# Patient Record
Sex: MALE | URBAN - METROPOLITAN AREA
[De-identification: names, ages, dates, MRNs, and addresses within clinical notes are randomized per-mention and may not be internally consistent; named-entity substitution may affect disease eponyms.]

---

## 2021-06-28 ENCOUNTER — APPOINTMENT (RX ONLY)
Dept: URBAN - METROPOLITAN AREA CLINIC 58 | Facility: CLINIC | Age: 46
Setting detail: DERMATOLOGY
End: 2021-06-28

## 2021-06-28 DIAGNOSIS — L81.4 OTHER MELANIN HYPERPIGMENTATION: ICD-10-CM | Status: INADEQUATELY CONTROLLED

## 2021-06-28 PROCEDURE — ? ADDITIONAL NOTES

## 2021-06-28 PROCEDURE — 99202 OFFICE O/P NEW SF 15 MIN: CPT

## 2021-06-28 PROCEDURE — ? COUNSELING

## 2021-06-28 ASSESSMENT — LOCATION DETAILED DESCRIPTION DERM: LOCATION DETAILED: SUPERIOR MID FOREHEAD

## 2021-06-28 ASSESSMENT — LOCATION SIMPLE DESCRIPTION DERM: LOCATION SIMPLE: SUPERIOR FOREHEAD

## 2021-06-28 ASSESSMENT — LOCATION ZONE DERM: LOCATION ZONE: FACE

## 2021-06-28 NOTE — PROCEDURE: ADDITIONAL NOTES
Additional Notes: Patient consent was obtained to proceed with the visit and recommended plan of care after discussion of all risks and benefits, including the risks of COVID-19 exposure.
Detail Level: Simple
Render Risk Assessment In Note?: no
Additional Notes: Patient presented with hyperpigmentation on forehead. Discussed treatment options with patient, including creams and/or lasers and chemical peels. Recommended patient see [facility] for further treatment options.

## 2021-06-29 ENCOUNTER — APPOINTMENT (RX ONLY)
Dept: URBAN - METROPOLITAN AREA CLINIC 57 | Facility: CLINIC | Age: 46
Setting detail: DERMATOLOGY
End: 2021-06-29

## 2021-06-29 DIAGNOSIS — L57.8 OTHER SKIN CHANGES DUE TO CHRONIC EXPOSURE TO NONIONIZING RADIATION: ICD-10-CM

## 2021-06-29 DIAGNOSIS — Z41.9 ENCOUNTER FOR PROCEDURE FOR PURPOSES OTHER THAN REMEDYING HEALTH STATE, UNSPECIFIED: ICD-10-CM

## 2021-06-29 PROCEDURE — 99212 OFFICE O/P EST SF 10 MIN: CPT

## 2021-06-29 PROCEDURE — ? SUNSCREEN RECOMMENDATIONS

## 2021-06-29 PROCEDURE — ? VI PEEL

## 2021-06-29 PROCEDURE — ? MEDICAL CONSULTATION HYDRAFACIAL

## 2021-06-29 PROCEDURE — ? COSMETIC CONSULTATION: CHEMICAL PEELS

## 2021-06-29 NOTE — PROCEDURE: SUNSCREEN RECOMMENDATIONS

## 2021-06-29 NOTE — PROCEDURE: VI PEEL
Chemical Peel: VI Peel Precision Plus
Prep: The treated area was degreased with pre-peel cleanser, and vaseline was applied for protection of mucous membranes.
Treatment Number: 1
Post Peel Care: After the procedure, the patient was instructed not to wash the treated area for 4 hours or manually remove dead skin when the peeling process starts. Patient may use OTC hydrocortisone cream for itching.  Patient instructed to use the provided Retin-A wipes on the treated area on the 1st and 2nd nights. Patient advised to follow instructions for 7 days before returning to regular skincare regimen.\\nAdvised patient they could apply a small amount of Vaseline around their mouth to reduce the discomfort they may experience. Instructed patient to email me with any questions or concerns.
Price (Use Numbers Only, No Special Characters Or $): 370.00
Detail Level: Zone
Consent: Prior to the procedure, written consent was obtained and risks were reviewed, including but not limited to: redness, peeling, blistering, pigmentary change, scarring, infection, and pain. Patient is aware multiple treatments may be necessary to achieve the desired outcome.
Post-Care Instructions: I reviewed with the patient in detail post-care instructions. Patient should avoid sun exposure and wear sun protection. Provided patient with a new clean sterile disposable face mask post treatment.

## 2021-07-27 ENCOUNTER — APPOINTMENT (RX ONLY)
Dept: URBAN - METROPOLITAN AREA CLINIC 57 | Facility: CLINIC | Age: 46
Setting detail: DERMATOLOGY
End: 2021-07-27

## 2021-07-27 DIAGNOSIS — Z41.9 ENCOUNTER FOR PROCEDURE FOR PURPOSES OTHER THAN REMEDYING HEALTH STATE, UNSPECIFIED: ICD-10-CM

## 2021-07-27 PROCEDURE — ? COSMETIC FOLLOW-UP

## 2021-07-27 PROCEDURE — ? VI PEEL

## 2021-07-27 NOTE — PROCEDURE: VI PEEL
Post Peel Care: After the procedure, the patient was instructed not to wash the treated area for 4 hours or manually remove dead skin when the peeling process starts. Patient may use OTC hydrocortisone cream for itching.  Patient instructed to use the provided Retin-A wipes on the treated area on the 1st and 2nd nights. Patient advised to follow instructions for 7 days before returning to regular skincare regimen.\\nInstructed patient to email me with any questions or concerns.
Detail Level: Zone
Post-Care Instructions: I reviewed with the patient in detail post-care instructions. Patient should avoid sun exposure and wear sun protection. Provided patient with a new clean sterile disposable face mask post treatment.
Chemical Peel: VI Peel Precision Plus
Price (Use Numbers Only, No Special Characters Or $): 370.00
Consent: Prior to the procedure, written consent was obtained and risks were reviewed, including but not limited to: redness, peeling, blistering, pigmentary change, scarring, infection, and pain. Patient is aware multiple treatments may be necessary to achieve the desired outcome.
Prep: The treated area was degreased with pre-peel cleanser
Treatment Number: 2

## 2021-07-27 NOTE — PROCEDURE: COSMETIC FOLLOW-UP
Side Effects Or Complications: None
Treatment Override (Free Text): vi peel precision plus
Global Improvement: Modest
Detail Level: Zone
Patient Satisfaction: Pleased
Treatment (Optional): Medium-depth Chemical Peel

## 2021-07-27 NOTE — HPI: COSMETIC (CHEMICAL PEEL)
Have You Had A Chemical Peel Before?: has had a previous peel
When Outside In The Sun, Do You...: mostly tans, rarely burns
When Was Your Last Peel?: One month ago

## 2021-09-16 ENCOUNTER — APPOINTMENT (RX ONLY)
Dept: URBAN - METROPOLITAN AREA CLINIC 57 | Facility: CLINIC | Age: 46
Setting detail: DERMATOLOGY
End: 2021-09-16

## 2021-09-16 DIAGNOSIS — Z41.9 ENCOUNTER FOR PROCEDURE FOR PURPOSES OTHER THAN REMEDYING HEALTH STATE, UNSPECIFIED: ICD-10-CM

## 2021-09-16 PROCEDURE — ? MEDICAL CONSULTATION HYDRAFACIAL

## 2021-09-16 PROCEDURE — ? VI PEEL

## 2021-09-16 NOTE — PROCEDURE: VI PEEL
Comments: Patient states he sees noticeable improvement and is very pleased. Patient states prior to beginning his peel series he started noticing acne issues mostly in his beard. I suggested using a cleanser from [employer] that has salicylic acid in it at least 2x weekly to help aid in eliminating the acne causing bacteria. 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

## 2021-09-16 NOTE — PROCEDURE: VI PEEL
Post Peel Care: After the procedure, the patient was instructed not to wash the treated area for 4 hours or manually remove dead skin when the peeling process starts. Patient may use OTC hydrocortisone cream for itching.  Patient instructed to use the provided Retin-A wipes on the treated area on the 1st and 2nd nights. Patient advised to follow instructions for 7 days before returning to regular skincare regimen.\\nInstructed patient to email me with any questions or concerns.

## 2021-09-16 NOTE — HPI: COSMETIC (CHEMICAL PEEL)
Have You Had A Chemical Peel Before?: has had a previous peel
When Outside In The Sun, Do You...: mostly tans, rarely burns
When Was Your Last Peel?: July 2021

## 2021-09-16 NOTE — PROCEDURE: VI PEEL
Post-Care Instructions: I reviewed with the patient in detail post-care instructions. Patient should avoid sun exposure and wear sun protection. Provided patient with a new clean sterile disposable face mask post treatment.

## 2021-09-16 NOTE — PROCEDURE: VI PEEL
Price (Use Numbers Only, No Special Characters Or $): 436 Price (Use Numbers Only, No Special Characters Or $): 158
